# Patient Record
Sex: FEMALE | Race: BLACK OR AFRICAN AMERICAN | NOT HISPANIC OR LATINO | Employment: OTHER | URBAN - METROPOLITAN AREA
[De-identification: names, ages, dates, MRNs, and addresses within clinical notes are randomized per-mention and may not be internally consistent; named-entity substitution may affect disease eponyms.]

---

## 2018-09-27 ENCOUNTER — HOSPITAL ENCOUNTER (EMERGENCY)
Facility: OTHER | Age: 78
Discharge: HOME | End: 2018-09-27
Attending: EMERGENCY MEDICINE
Payer: MEDICARE

## 2018-09-27 VITALS
HEIGHT: 66 IN | SYSTOLIC BLOOD PRESSURE: 146 MMHG | OXYGEN SATURATION: 96 % | TEMPERATURE: 99 F | DIASTOLIC BLOOD PRESSURE: 70 MMHG | BODY MASS INDEX: 35.36 KG/M2 | RESPIRATION RATE: 20 BRPM | WEIGHT: 220 LBS | HEART RATE: 76 BPM

## 2018-09-27 DIAGNOSIS — M17.12 OSTEOARTHRITIS OF LEFT KNEE, UNSPECIFIED OSTEOARTHRITIS TYPE: ICD-10-CM

## 2018-09-27 DIAGNOSIS — M79.662 PAIN AND SWELLING OF LEFT LOWER LEG: ICD-10-CM

## 2018-09-27 DIAGNOSIS — M79.89 PAIN AND SWELLING OF LEFT LOWER LEG: ICD-10-CM

## 2018-09-27 DIAGNOSIS — M71.22 BAKER CYST, LEFT: ICD-10-CM
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
## 2018-09-27 LAB
ALBUMIN SERPL BCP-MCNC: 3.2 G/DL
ALP SERPL-CCNC: 142 U/L
ALT SERPL W/O P-5'-P-CCNC: 11 U/L
ANION GAP SERPL CALC-SCNC: 11 MMOL/L
AST SERPL-CCNC: 20 U/L
BASOPHILS # BLD AUTO: 0.02 K/UL
BASOPHILS NFR BLD: 0.4 %
BILIRUB SERPL-MCNC: 0.4 MG/DL
BUN SERPL-MCNC: 13 MG/DL
CALCIUM SERPL-MCNC: 9.3 MG/DL
CHLORIDE SERPL-SCNC: 91 MMOL/L
CO2 SERPL-SCNC: 39 MMOL/L
CREAT SERPL-MCNC: 4.4 MG/DL
DIFFERENTIAL METHOD: ABNORMAL
EOSINOPHIL # BLD AUTO: 0.2 K/UL
EOSINOPHIL NFR BLD: 3 %
ERYTHROCYTE [DISTWIDTH] IN BLOOD BY AUTOMATED COUNT: 16.3 %
EST. GFR  (AFRICAN AMERICAN): 10 ML/MIN/1.73 M^2
EST. GFR  (NON AFRICAN AMERICAN): 9 ML/MIN/1.73 M^2
GLUCOSE SERPL-MCNC: 220 MG/DL
HCT VFR BLD AUTO: 31.2 %
HGB BLD-MCNC: 9.4 G/DL
LYMPHOCYTES # BLD AUTO: 1.4 K/UL
LYMPHOCYTES NFR BLD: 27.5 %
MCH RBC QN AUTO: 27.2 PG
MCHC RBC AUTO-ENTMCNC: 30.1 G/DL
MCV RBC AUTO: 90 FL
MONOCYTES # BLD AUTO: 0.6 K/UL
MONOCYTES NFR BLD: 11.1 %
NEUTROPHILS # BLD AUTO: 2.9 K/UL
NEUTROPHILS NFR BLD: 57.8 %
PLATELET # BLD AUTO: 157 K/UL
PMV BLD AUTO: 11.8 FL
POTASSIUM SERPL-SCNC: 3.5 MMOL/L
PROT SERPL-MCNC: 8.5 G/DL
RBC # BLD AUTO: 3.46 M/UL
SODIUM SERPL-SCNC: 141 MMOL/L
WBC # BLD AUTO: 4.94 K/UL

## 2018-09-27 PROCEDURE — 25000003 PHARM REV CODE 250: Performed by: PHYSICIAN ASSISTANT

## 2018-09-27 PROCEDURE — 80053 COMPREHEN METABOLIC PANEL: CPT

## 2018-09-27 PROCEDURE — 85025 COMPLETE CBC W/AUTO DIFF WBC: CPT

## 2018-09-27 PROCEDURE — 99284 EMERGENCY DEPT VISIT MOD MDM: CPT | Mod: 25

## 2018-09-27 RX ORDER — TRAMADOL HYDROCHLORIDE 50 MG/1
50 TABLET ORAL EVERY 8 HOURS PRN
Qty: 10 TABLET | Refills: 0 | Status: SHIPPED | OUTPATIENT
Start: 2018-09-27 | End: 2018-10-10 | Stop reason: SDUPTHER

## 2018-09-27 RX ORDER — ACETAMINOPHEN 500 MG
1000 TABLET ORAL
Status: COMPLETED | OUTPATIENT
Start: 2018-09-27 | End: 2018-09-27

## 2018-09-27 RX ORDER — OXYCODONE HYDROCHLORIDE 5 MG/1
5 TABLET ORAL
Status: COMPLETED | OUTPATIENT
Start: 2018-09-27 | End: 2018-09-27

## 2018-09-27 RX ADMIN — ACETAMINOPHEN 1000 MG: 500 TABLET, FILM COATED ORAL at 04:09

## 2018-09-27 RX ADMIN — OXYCODONE HYDROCHLORIDE 5 MG: 5 TABLET ORAL at 05:09

## 2018-09-27 NOTE — ED NOTES
Pt presents with c/o rule out DVT. Pt c/o pain and swelling to her LLE x4 days. Pt does not appear to be in acute distress. RR are even and unlabored. SKin is warm and dry.

## 2018-09-27 NOTE — ED PROVIDER NOTES
Encounter Date: 9/27/2018       History     Chief Complaint   Patient presents with    Leg Pain     left leg pain and swelling, sent by PCP for DVT rule out     Patient is a 78-year-old female with arthritis, diabetes, hypertension, and end-stage renal disease on dialysis who presents to the ED with leg pain and swelling.  Patient states she had a fall 1 week ago while doing laundry.  She states she fell onto her left leg.  She reports no pain immediately after this.  Patient reports onset of left knee and left calf pain and swelling 3 days ago.  Patient states she told her primary care provider about this told her to come to the ED to rule out a blood clot.  Patient states she received her full dialysis today.  She denies chest pain or shortness of breath.  She denies history of blood clots.  She is currently not on any blood thinners.          Review of patient's allergies indicates:   Allergen Reactions    Pcn [penicillins]      Past Medical History:   Diagnosis Date    Arthritis     Diabetes mellitus     Hemodialysis patient     Hypertension     Renal disorder      History reviewed. No pertinent surgical history.  No family history on file.  Social History     Tobacco Use    Smoking status: Never Smoker   Substance Use Topics    Alcohol use: No    Drug use: No     Review of Systems   Constitutional: Negative for chills and fever.   HENT: Negative for congestion and sore throat.    Eyes: Negative for pain.   Respiratory: Negative for shortness of breath.    Cardiovascular: Negative for chest pain.   Gastrointestinal: Negative for abdominal pain, diarrhea, nausea and vomiting.   Genitourinary: Negative for dysuria.   Musculoskeletal:        Left lower leg pain and swelling   Skin: Negative for rash.   Neurological: Negative for headaches.       Physical Exam     Initial Vitals [09/27/18 1521]   BP Pulse Resp Temp SpO2   (!) 177/75 77 20 98.4 °F (36.9 °C) 97 %      MAP       --         Physical  Exam    Constitutional: Vital signs are normal. She is cooperative. No distress.   HENT:   Head: Normocephalic and atraumatic.   Eyes: EOM are normal. Pupils are equal, round, and reactive to light.   Neck: Normal range of motion. Neck supple.   Cardiovascular: Normal rate, regular rhythm and intact distal pulses.   Pulmonary/Chest: Breath sounds normal. She has no wheezes. She has no rhonchi. She has no rales.   Abdominal: Soft. Bowel sounds are normal. There is no tenderness.   Musculoskeletal:   Bilateral lower extremities exhibit 2+ pitting, pretibial edema.   LLE:  No knee laxity or effusion.  Tenderness to the deep venous system.  Left calf does not appear to be larger than the right.  Positive Homans sign. No obvious bony deformity.   Neurological: She is alert and oriented to person, place, and time. She has normal strength. GCS eye subscore is 4. GCS verbal subscore is 5. GCS motor subscore is 6.   Skin: Skin is warm and dry. Capillary refill takes less than 2 seconds. No rash noted.   Left lower extremity does not exhibit erythema or warmth.   Psychiatric: She has a normal mood and affect. Her behavior is normal.         ED Course   Procedures  Labs Reviewed   CBC W/ AUTO DIFFERENTIAL   COMPREHENSIVE METABOLIC PANEL          Imaging Results          X-Ray Tibia Fibula 2 View Left (In process)                X-Ray Knee 3 View Left (In process)                  Medical Decision Making:   Initial Assessment:   Urgent evaluation of a 78 y.o. Female presenting to the emergency department complaining of left lower leg pain. Patient is afebrile, nontoxic appearing and hemodynamically stable.  Patient had mechanical fall 1 week ago.  Pain to left lower extremity did not start until 3 days later.  Patient reports left lower extremity is more swollen.  Both legs appear to be equally edematous.  No obvious bony deformities on exam.  Normal strength.  Normal pedal pulses. Will obtain x-rays and ultrasound to rule out  fracture DVT.  ED Management:  Knee and tib-fib x-ray reveals no acute fracture or dislocation.  There is a small knee effusion noted there is severe narrowing of medial tibial femoral cartilage space with subchondral cyst sclerosis and tricompartmental osteophyte production. Left lower extremity reveals no evidence of DVT.  There is a Baker cyst present.  Patient has degenerative changes in the Baker cyst may be causing her pain. Ace wrap was applied to left knee.  Patient states she has a walker at home.  Baseline labs were obtained in case patient needed anticoagulant.  Lab work reveals anemia that is at patient's baseline.  CMP revealed hyperglycemia, glucose is 220.  Bicarb is 39. Creatinine is 4.4 with GFR of 10 that is at patient's baseline.  No anion gap.  Patient reports relief after receiving Tylenol and oxycodone here in the ED.  Will send her home with a short course of tramadol for her pain she is strongly encouraged to follow up with her primary care provider.  Patient has no other complaints this time is stable for discharge.  Other:   I have discussed this case with another health care provider.  This note was created using MModal Dictation. There may be typographical errors secondary to dictation.                       Clinical Impression:     1. Fall, initial encounter    2. Pain and swelling of left lower leg    3. Osteoarthritis of left knee, unspecified osteoarthritis type    4. Baker cyst, left                               Evelio Finnegan PA-C  09/27/18 6221

## 2018-10-10 ENCOUNTER — HOSPITAL ENCOUNTER (EMERGENCY)
Facility: OTHER | Age: 78
Discharge: HOME | End: 2018-10-10
Attending: EMERGENCY MEDICINE
Payer: MEDICARE

## 2018-10-10 VITALS
SYSTOLIC BLOOD PRESSURE: 180 MMHG | WEIGHT: 230 LBS | HEIGHT: 67 IN | DIASTOLIC BLOOD PRESSURE: 86 MMHG | RESPIRATION RATE: 18 BRPM | BODY MASS INDEX: 36.1 KG/M2 | OXYGEN SATURATION: 98 % | HEART RATE: 75 BPM | TEMPERATURE: 99 F

## 2018-10-10 DIAGNOSIS — M71.22 BAKER'S CYST OF KNEE, LEFT: Primary | ICD-10-CM

## 2018-10-10 DIAGNOSIS — M25.462 PAIN AND SWELLING OF KNEE, LEFT: ICD-10-CM

## 2018-10-10 DIAGNOSIS — M25.562 PAIN AND SWELLING OF KNEE, LEFT: ICD-10-CM

## 2018-10-10 DIAGNOSIS — M79.89 LEFT LEG SWELLING: ICD-10-CM

## 2018-10-10 PROCEDURE — 25000003 PHARM REV CODE 250: Performed by: EMERGENCY MEDICINE

## 2018-10-10 PROCEDURE — 99284 EMERGENCY DEPT VISIT MOD MDM: CPT | Mod: 25

## 2018-10-10 RX ORDER — TRAMADOL HYDROCHLORIDE 50 MG/1
50 TABLET ORAL EVERY 12 HOURS PRN
Qty: 8 TABLET | Refills: 0 | Status: SHIPPED | OUTPATIENT
Start: 2018-10-10

## 2018-10-10 RX ORDER — HYDROCODONE BITARTRATE AND ACETAMINOPHEN 5; 325 MG/1; MG/1
1 TABLET ORAL
Status: COMPLETED | OUTPATIENT
Start: 2018-10-10 | End: 2018-10-10

## 2018-10-10 RX ADMIN — HYDROCODONE BITARTRATE AND ACETAMINOPHEN 1 TABLET: 5; 325 TABLET ORAL at 09:10

## 2018-10-11 NOTE — ED PROVIDER NOTES
Encounter Date: 10/10/2018    SCRIBE #1 NOTE: IIngrid, am scribing for, and in the presence of, Dr. Rutherford.       History     Chief Complaint   Patient presents with    Leg Pain     hurtig X 2 weeks swelling since Sunday     Time seen by provider: 9:08 PM    This is a 78 y.o female, with history of diabetes, HTN, and end-stage renal disease on dialysis who presents with complaint of left leg pain and swelling. The patient states pain began two weeks ago secondary to a fall. She states she was doing laundry and twisted her left leg.  The pain is located to the lateral side of left leg and from the knee down. Pain is worse with walking and extension of left leg. She notes leg swelling began four days ago. The swelling is located from the knee down to the feet. Patient was seen here on 9/27/2018 for the same onset. Per patient, at that time she did not have any leg swelling. At last visit, she had an ultrasound and x-rays done, which were normal. She also reports she was seen at [facility] where she had a CT done with normal results. Patient has an upcoming appointment with an orthopedic at [facility]. She denies history of blood clots. Her last dialysis session was yesterday, in which she received her full dialysis. She is due for another session tomorrow. Patient denies chest pain. SOB, fever, chills, nausea, vomiting, numbness or weakness.      The history is provided by the patient.     Review of patient's allergies indicates:   Allergen Reactions    Pcn [penicillins]      Past Medical History:   Diagnosis Date    Arthritis     Diabetes mellitus     Hemodialysis patient     Hypertension     Renal disorder      Past Surgical History:   Procedure Laterality Date    hemodyalisys cath placement      HYSTERECTOMY      KNEE SURGERY       History reviewed. No pertinent family history.  Social History     Tobacco Use    Smoking status: Never Smoker   Substance Use Topics    Alcohol use: No     Drug use: No     Review of Systems   Constitutional: Negative for chills and fever.   HENT: Negative for rhinorrhea, sore throat and trouble swallowing.    Eyes: Negative for visual disturbance.   Respiratory: Negative for shortness of breath.    Cardiovascular: Positive for leg swelling. Negative for chest pain.   Gastrointestinal: Negative for abdominal pain, nausea and vomiting.   Genitourinary: Negative for dysuria.   Musculoskeletal: Negative for back pain and neck pain.        Positive for leg pain.   Skin: Negative for wound.   Neurological: Negative for weakness, numbness and headaches.   Psychiatric/Behavioral: Negative for confusion.       Physical Exam     Initial Vitals [10/10/18 2047]   BP Pulse Resp Temp SpO2   130/78 82 18 98.7 °F (37.1 °C) 96 %      MAP       --         Physical Exam    Nursing note and vitals reviewed.  Constitutional: She appears well-developed and well-nourished. No distress.   HENT:   Head: Normocephalic and atraumatic.   Right Ear: External ear normal.   Left Ear: External ear normal.   Eyes: Pupils are equal, round, and reactive to light.   Neck: Normal range of motion.   Cardiovascular: Normal rate, regular rhythm and normal heart sounds. Exam reveals no friction rub.    No murmur heard.  Pulses:       Dorsalis pedis pulses are 2+ on the right side, and 2+ on the left side.   Pulmonary/Chest: Breath sounds normal. No respiratory distress. She has no wheezes.   Dialysis catheter to left chest wall.   Abdominal: Soft. There is no tenderness. There is no rebound and no guarding.   Musculoskeletal:   1+ pitting edema below knee of left leg.   Trace pitting edema of right leg.   Extension of knee is limited secondary to pain.   Tenderness along lateral aspect of left leg.   No ecchymosis.   No obvious deformities.   Compartments are soft.  No areas of erythema or warmth.   Neurological: She is alert and oriented to person, place, and time. She has normal strength.   Skin: Skin is  dry. No erythema.   Psychiatric: She has a normal mood and affect. Her behavior is normal. Judgment and thought content normal.         ED Course   Procedures  Labs Reviewed - No data to display       Imaging Results          US Lower Extremity Veins Left (Final result)  Result time 10/10/18 22:40:13    Final result by Radha Poon MD (10/10/18 22:40:13)                 Impression:      No evidence of left lower extremity deep venous thrombosis.    Complex left Baker cyst.      Electronically signed by: Radha Poon MD  Date:    10/10/2018  Time:    22:40             Narrative:    EXAMINATION:  US LOWER EXTREMITY VEINS LEFT    CLINICAL HISTORY:  Other specified soft tissue disorders    TECHNIQUE:  Duplex and color flow Doppler evaluation of the left lower extremity veins was performed.    COMPARISON:  09/27/2018.    FINDINGS:  No evidence of clot involving the bilateral common femoral veins or left greater saphenous, femoral, popliteal, peroneal, anterior and posterior tibial veins.  All venous structures demonstrate normal respiratory phasicity and augment adequately.  Complex left Baker's cyst is visualized measuring 3.7 x 3.1 x 2.1 cm, stable.  Prominent subcutaneous soft tissue edema is seen involving the left lower leg and ankle.                                 Medical Decision Making:   Clinical Tests:   Radiological Study: Ordered and Reviewed    Additional MDM:   Comments: && y/o female with persistent LLE pain x 2 weeks now with edema from the knee down x 4 days.  Pt N/V intact with pitting and decreased ROM 2/2 pain primarily along the lateral aspect of the knee.  I have reviewed the radiology studies from her recent visit and they were + for tricompartmental DJD and a baker's cyst with no Fx/dislocation or DVT noted.  Given persistence of pain now associated with edema, we will repeat the US although my suspicion is for a ligamentous injury.  I have discussed this with the patient and her .   If the US is neg for a DVT she will be d/c'ed home with a knee immobilizer and crutches with plan for her to keep her appointment with ortho at [facility] for further evaluation.      11:04 PM  Ultrasound shows no new findings.  Results were discussed with the patient and the plan was reiterated.  She verbalized understanding and agreement.  She will be discharged at this time with a walker and knee immobilizer.          Scribe Attestation:   Scribe #1: I performed the above scribed service and the documentation accurately describes the services I performed. I attest to the accuracy of the note.    Attending Attestation:           Physician Attestation for Scribe:  Physician Attestation Statement for Scribe #1: I, Dr. Rutherford, reviewed documentation, as scribed by Ingrid Maldonado in my presence, and it is both accurate and complete.                    Clinical Impression:     1. Baker's cyst of knee, left    2. Left leg swelling    3. Pain and swelling of knee, left                                   Nova Rutherford MD  10/10/18 [id number]

## 2018-10-11 NOTE — ED TRIAGE NOTES
"Pt arrived to ED with c/o left knee pain that radiates down the leg x2 weeks and left leg swelling since Sunday. Pt goes to dialysis on T/Thurs/Sat and states " I thought the swelling was going to go down after dialysis but it hasn't and my leg is constantly hurting" . Pt denies sob, fever, chills, n/v, cp.  Moderate swelling noted to bilateral legs. +2 bilateral pedial pulses noted   "

## 2018-10-11 NOTE — DISCHARGE INSTRUCTIONS
Take Tylenol as needed for milder pain. Take tramadol as prescribed only for severe pain. Knee immobilizer on at all times except for showering/bathing.  Follow up with Orthopedics at [facility] as already scheduled.

## 2019-04-17 ENCOUNTER — HOSPITAL ENCOUNTER (EMERGENCY)
Facility: OTHER | Age: 79
Discharge: HOME | End: 2019-04-17
Attending: EMERGENCY MEDICINE
Payer: MEDICARE

## 2019-04-17 VITALS
TEMPERATURE: 98 F | DIASTOLIC BLOOD PRESSURE: 114 MMHG | BODY MASS INDEX: 36.96 KG/M2 | SYSTOLIC BLOOD PRESSURE: 192 MMHG | HEIGHT: 66 IN | HEART RATE: 74 BPM | RESPIRATION RATE: 18 BRPM | WEIGHT: 230 LBS | OXYGEN SATURATION: 98 %

## 2019-04-17 DIAGNOSIS — K80.20 CALCULUS OF GALLBLADDER WITHOUT CHOLECYSTITIS WITHOUT OBSTRUCTION: ICD-10-CM

## 2019-04-17 DIAGNOSIS — Z99.2 ESRD ON DIALYSIS: ICD-10-CM

## 2019-04-17 DIAGNOSIS — R31.9 HEMATURIA, UNSPECIFIED TYPE: ICD-10-CM

## 2019-04-17 DIAGNOSIS — K57.90 DIVERTICULOSIS OF INTESTINE WITHOUT BLEEDING, UNSPECIFIED INTESTINAL TRACT LOCATION: ICD-10-CM

## 2019-04-17 DIAGNOSIS — R10.9 ABDOMINAL PAIN: Primary | ICD-10-CM

## 2019-04-17 DIAGNOSIS — K44.9 HIATAL HERNIA: ICD-10-CM

## 2019-04-17 DIAGNOSIS — N18.6 ESRD ON DIALYSIS: ICD-10-CM

## 2019-04-17 DIAGNOSIS — R80.9 PROTEINURIA, UNSPECIFIED TYPE: ICD-10-CM

## 2019-04-17 DIAGNOSIS — R73.9 HYPERGLYCEMIA: ICD-10-CM

## 2019-04-17 DIAGNOSIS — D64.9 ANEMIA, UNSPECIFIED TYPE: ICD-10-CM

## 2019-04-17 LAB
ALBUMIN SERPL BCP-MCNC: 3.2 G/DL (ref 3.5–5.2)
ALP SERPL-CCNC: 126 U/L (ref 55–135)
ALT SERPL W/O P-5'-P-CCNC: 11 U/L (ref 10–44)
ANION GAP SERPL CALC-SCNC: 14 MMOL/L (ref 8–16)
AST SERPL-CCNC: 11 U/L (ref 10–40)
B-OH-BUTYR BLD STRIP-SCNC: 0.1 MMOL/L (ref 0–0.5)
BACTERIA #/AREA URNS HPF: ABNORMAL /HPF
BASOPHILS # BLD AUTO: 0.02 K/UL (ref 0–0.2)
BASOPHILS NFR BLD: 0.4 % (ref 0–1.9)
BILIRUB SERPL-MCNC: 0.8 MG/DL (ref 0.1–1)
BILIRUB UR QL STRIP: NEGATIVE
BUN SERPL-MCNC: 35 MG/DL (ref 8–23)
CALCIUM SERPL-MCNC: 9.4 MG/DL (ref 8.7–10.5)
CHLORIDE SERPL-SCNC: 89 MMOL/L (ref 95–110)
CLARITY UR: ABNORMAL
CO2 SERPL-SCNC: 34 MMOL/L (ref 23–29)
COLOR UR: YELLOW
CREAT SERPL-MCNC: 8.5 MG/DL (ref 0.5–1.4)
DIFFERENTIAL METHOD: ABNORMAL
EOSINOPHIL # BLD AUTO: 0.2 K/UL (ref 0–0.5)
EOSINOPHIL NFR BLD: 3.9 % (ref 0–8)
ERYTHROCYTE [DISTWIDTH] IN BLOOD BY AUTOMATED COUNT: 18.3 % (ref 11.5–14.5)
EST. GFR  (AFRICAN AMERICAN): 5 ML/MIN/1.73 M^2
EST. GFR  (NON AFRICAN AMERICAN): 4 ML/MIN/1.73 M^2
GLUCOSE SERPL-MCNC: 353 MG/DL (ref 70–110)
GLUCOSE UR QL STRIP: ABNORMAL
HCT VFR BLD AUTO: 38 % (ref 37–48.5)
HGB BLD-MCNC: 11.9 G/DL (ref 12–16)
HGB UR QL STRIP: ABNORMAL
HYALINE CASTS #/AREA URNS LPF: 0 /LPF
KETONES UR QL STRIP: NEGATIVE
LEUKOCYTE ESTERASE UR QL STRIP: ABNORMAL
LIPASE SERPL-CCNC: 19 U/L (ref 4–60)
LYMPHOCYTES # BLD AUTO: 1.5 K/UL (ref 1–4.8)
LYMPHOCYTES NFR BLD: 27.4 % (ref 18–48)
MCH RBC QN AUTO: 25.5 PG (ref 27–31)
MCHC RBC AUTO-ENTMCNC: 31.3 G/DL (ref 32–36)
MCV RBC AUTO: 81 FL (ref 82–98)
MICROSCOPIC COMMENT: ABNORMAL
MONOCYTES # BLD AUTO: 0.4 K/UL (ref 0.3–1)
MONOCYTES NFR BLD: 7.4 % (ref 4–15)
NEUTROPHILS # BLD AUTO: 3.3 K/UL (ref 1.8–7.7)
NEUTROPHILS NFR BLD: 60.9 % (ref 38–73)
NITRITE UR QL STRIP: NEGATIVE
PH UR STRIP: 8 [PH] (ref 5–8)
PLATELET # BLD AUTO: 136 K/UL (ref 150–350)
PLATELET BLD QL SMEAR: ABNORMAL
PMV BLD AUTO: 11.8 FL (ref 9.2–12.9)
POCT GLUCOSE: 277 MG/DL (ref 70–110)
POIKILOCYTOSIS BLD QL SMEAR: SLIGHT
POTASSIUM SERPL-SCNC: 4.4 MMOL/L (ref 3.5–5.1)
PROT SERPL-MCNC: 8.1 G/DL (ref 6–8.4)
PROT UR QL STRIP: ABNORMAL
RBC # BLD AUTO: 4.67 M/UL (ref 4–5.4)
RBC #/AREA URNS HPF: 3 /HPF (ref 0–4)
SODIUM SERPL-SCNC: 137 MMOL/L (ref 136–145)
SP GR UR STRIP: 1.01 (ref 1–1.03)
TARGETS BLD QL SMEAR: ABNORMAL
TROPONIN I SERPL DL<=0.01 NG/ML-MCNC: 0.01 NG/ML (ref 0–0.03)
URN SPEC COLLECT METH UR: ABNORMAL
UROBILINOGEN UR STRIP-ACNC: NEGATIVE EU/DL
WBC # BLD AUTO: 5.4 K/UL (ref 3.9–12.7)
WBC #/AREA URNS HPF: 25 /HPF (ref 0–5)
WBC CLUMPS URNS QL MICRO: ABNORMAL

## 2019-04-17 PROCEDURE — 82010 KETONE BODYS QUAN: CPT

## 2019-04-17 PROCEDURE — 93010 EKG 12-LEAD: ICD-10-PCS | Mod: ,,, | Performed by: INTERNAL MEDICINE

## 2019-04-17 PROCEDURE — 81000 URINALYSIS NONAUTO W/SCOPE: CPT

## 2019-04-17 PROCEDURE — 93010 ELECTROCARDIOGRAM REPORT: CPT | Mod: ,,, | Performed by: INTERNAL MEDICINE

## 2019-04-17 PROCEDURE — 80053 COMPREHEN METABOLIC PANEL: CPT

## 2019-04-17 PROCEDURE — 83690 ASSAY OF LIPASE: CPT

## 2019-04-17 PROCEDURE — 25000003 PHARM REV CODE 250: Performed by: EMERGENCY MEDICINE

## 2019-04-17 PROCEDURE — 99284 EMERGENCY DEPT VISIT MOD MDM: CPT | Mod: 25

## 2019-04-17 PROCEDURE — 96360 HYDRATION IV INFUSION INIT: CPT

## 2019-04-17 PROCEDURE — 82962 GLUCOSE BLOOD TEST: CPT

## 2019-04-17 PROCEDURE — 84484 ASSAY OF TROPONIN QUANT: CPT

## 2019-04-17 PROCEDURE — 25500020 PHARM REV CODE 255: Performed by: EMERGENCY MEDICINE

## 2019-04-17 PROCEDURE — 87088 URINE BACTERIA CULTURE: CPT

## 2019-04-17 PROCEDURE — 87077 CULTURE AEROBIC IDENTIFY: CPT

## 2019-04-17 PROCEDURE — 87186 SC STD MICRODIL/AGAR DIL: CPT

## 2019-04-17 PROCEDURE — 85025 COMPLETE CBC W/AUTO DIFF WBC: CPT

## 2019-04-17 PROCEDURE — 93005 ELECTROCARDIOGRAM TRACING: CPT

## 2019-04-17 PROCEDURE — 87086 URINE CULTURE/COLONY COUNT: CPT

## 2019-04-17 RX ORDER — CIPROFLOXACIN 500 MG/1
500 TABLET ORAL 2 TIMES DAILY
Qty: 20 TABLET | Refills: 0 | Status: SHIPPED | OUTPATIENT
Start: 2019-04-17 | End: 2019-04-17 | Stop reason: SDUPTHER

## 2019-04-17 RX ORDER — CIPROFLOXACIN 500 MG/1
500 TABLET ORAL 2 TIMES DAILY
Qty: 14 TABLET | Refills: 0 | Status: SHIPPED | OUTPATIENT
Start: 2019-04-17 | End: 2019-04-24

## 2019-04-17 RX ADMIN — IOHEXOL 30 ML: 350 INJECTION, SOLUTION INTRAVENOUS at 05:04

## 2019-04-17 RX ADMIN — SODIUM CHLORIDE 500 ML: 0.9 INJECTION, SOLUTION INTRAVENOUS at 07:04

## 2019-04-17 NOTE — ED TRIAGE NOTES
Pt c/o abd discomfort and gas that has been ongoing since Saturday. Pt states symptoms worsened today. Pain from LLQ radiates across to lower back. Pt is a dialysis pt. Pt received dialysis yesterday with no complications. Pt reports still making small amounts of urine. Pt denies any urinary frequency, burning or blood in urine. Pt reports having normal bowel movement. Pt denies cp, fever, chills, N/V/D, dizziness, or weakness. NAD noted. Resp even and unlabored.

## 2019-04-17 NOTE — ED NOTES
Pt unable to urinate into bed pan. In and out catheter done with sterile technique to collect urine. Pt tolerated well.

## 2019-04-17 NOTE — ED PROVIDER NOTES
Encounter Date: 4/17/2019    SCRIBE #1 NOTE: IBrenda, rose scribing for, and in the presence of, Dr. Burgess.       History     Chief Complaint   Patient presents with    Flank Pain     L flank pain since Saturday. reports cough x 2 weeks and pain is worse with coughing. denies any injury or change in urination     Time seen by provider: 5:03 PM    This is a 78 y.o. female with hx of HTN, DM, and ESRD on dialysis TTS who presents with complaint of L sided abdominal pain, radiating around into the L side of back for four days. Pt states that the pain is under her ribs. She states that it is intermittent, having onset with movement. Pt states that she was watching TV at onset. She had dialysis yesterday, with no complications or complaints of pain during. She also c/o belching and passing gas more than baseline. Pt has no hx of nephrolithiasis, diverticulitis, or diverticulosis. She has PSHx of hysterectomy, with no other abdominal surgeries. Pt does not smoke or drink. She is allergic to penicillins. Pt has been drinking and eating normally. She denies any fever, chills, N/V/D, constipation, chest pain, SOB, urinary sx, neck pain, lightheadedness, and weakness. Her dialysis is followed by Dr. Shin. She has been on dialysis for three years.    The history is provided by the patient.     Review of patient's allergies indicates:   Allergen Reactions    Pcn [penicillins]      Past Medical History:   Diagnosis Date    Arthritis     Diabetes mellitus     Hemodialysis patient     Hypertension     Renal disorder      Past Surgical History:   Procedure Laterality Date    hemodyalisys cath placement      HYSTERECTOMY      KNEE SURGERY       History reviewed. No pertinent family history.  Social History     Tobacco Use    Smoking status: Never Smoker   Substance Use Topics    Alcohol use: No    Drug use: No     Review of Systems   Constitutional: Negative for chills and fever.   HENT: Negative for  congestion and sore throat.    Eyes: Negative for photophobia and redness.   Respiratory: Negative for cough and shortness of breath.    Cardiovascular: Negative for chest pain.   Gastrointestinal: Positive for abdominal pain (L sided). Negative for constipation, diarrhea, nausea and vomiting.        Positive for passing gas and belching beyond baseline.   Genitourinary: Negative for difficulty urinating, dysuria, frequency, hematuria and urgency.   Musculoskeletal: Positive for back pain (L sided). Negative for neck pain.   Skin: Negative for rash.   Neurological: Negative for weakness, light-headedness and headaches.   Psychiatric/Behavioral: Negative for confusion.       Physical Exam     Initial Vitals [04/17/19 1633]   BP Pulse Resp Temp SpO2   133/66 74 18 98.4 °F (36.9 °C) (!) 93 %      MAP       --         Physical Exam    Nursing note and vitals reviewed.  Constitutional: She appears well-developed and well-nourished. She is not diaphoretic. No distress.   HENT:   Head: Normocephalic and atraumatic.   Right Ear: External ear normal.   Left Ear: External ear normal.   Eyes: Conjunctivae and EOM are normal. Pupils are equal, round, and reactive to light. Right eye exhibits no discharge. Left eye exhibits no discharge.   Neck: Normal range of motion. Neck supple.   Cardiovascular: Normal rate, regular rhythm and normal heart sounds.   Normal S1, S2. No murmurs, no rubs, no gallops.    Pulmonary/Chest: Breath sounds normal. No respiratory distress. She has no wheezes. She has no rhonchi. She has no rales.   Abdominal: Soft. Bowel sounds are normal. She exhibits no distension. There is tenderness (L, mid quadrant). There is no rebound and no guarding.   Musculoskeletal: Normal range of motion. She exhibits no edema or tenderness.   Lymphadenopathy:     She has no cervical adenopathy.   Neurological: She is alert and oriented to person, place, and time. She has normal strength. No sensory deficit.   Skin: Skin is  warm and dry. No rash noted. No erythema.   No rash or vesicular lesions.   Psychiatric: She has a normal mood and affect. Her behavior is normal.         ED Course   Procedures  Labs Reviewed   CBC W/ AUTO DIFFERENTIAL - Abnormal; Notable for the following components:       Result Value    Hemoglobin 11.9 (*)     MCV 81 (*)     MCH 25.5 (*)     MCHC 31.3 (*)     RDW 18.3 (*)     Platelets 136 (*)     Platelet Estimate Decreased (*)     All other components within normal limits   COMPREHENSIVE METABOLIC PANEL - Abnormal; Notable for the following components:    Chloride 89 (*)     CO2 34 (*)     Glucose 353 (*)     BUN, Bld 35 (*)     Creatinine 8.5 (*)     Albumin 3.2 (*)     eGFR if  5 (*)     eGFR if non  4 (*)     All other components within normal limits   URINALYSIS, REFLEX TO URINE CULTURE - Abnormal; Notable for the following components:    Appearance, UA Hazy (*)     Protein, UA 2+ (*)     Glucose, UA 1+ (*)     Occult Blood UA 2+ (*)     Leukocytes, UA 3+ (*)     All other components within normal limits    Narrative:     Preferred Collection Type->Urine, Clean Catch   URINALYSIS MICROSCOPIC - Abnormal; Notable for the following components:    WBC, UA 25 (*)     WBC Clumps, UA Occasional (*)     Bacteria, UA Few (*)     All other components within normal limits    Narrative:     Preferred Collection Type->Urine, Clean Catch   POCT GLUCOSE - Abnormal; Notable for the following components:    POCT Glucose 277 (*)     All other components within normal limits   CULTURE, URINE   LIPASE   TROPONIN I   BETA - HYDROXYBUTYRATE, SERUM     EKG Readings: (Independently Interpreted)   NSR at a rate of 73. LBBB, new when compared when compared to EKG from October 22, 2016.       Imaging Results          CT Abdomen Pelvis  Without Contrast (Final result)  Result time 04/17/19 19:19:39    Final result by Donnie Jules MD (04/17/19 19:19:39)                 Impression:      1. Large  amount of stool in the rectum noting rectal wall thickening and indistinctness about the rectal walls.  Findings may reflect developing stercoral proctitis in the setting of constipation and/or impaction.  Clinical correlation is recommended.  2. Multiple scattered colonic diverticula, no convincing inflammation to suggest diverticulitis.  3. Low attenuating lesions within the kidneys, the majority of which are too small for characterization, some of which may reflect cysts.  4. Moderate hiatal hernia.  5. Cholelithiasis.  6. Possible calculus within the proximal left ureter versus adjacent phlebolith.  Please note, there are no secondary signs of obstructive uropathy, this may well lie outside of the ureter.  Comparison with any previous exams would be helpful.  7. Additional findings above      Electronically signed by: Donnie Jules MD  Date:    04/17/2019  Time:    19:19             Narrative:    EXAMINATION:  CT ABDOMEN PELVIS WITHOUT CONTRAST    CLINICAL HISTORY:  LLQ pain, suspect diverticulitis;    TECHNIQUE:  Low dose axial images, sagittal and coronal reformations were obtained from the lung bases to the pubic symphysis.  30 mL of oral Omnipaque 350 was administered.    COMPARISON:  None    FINDINGS:  Images of the lower thorax are remarkable for bilateral dependent atelectasis/scarring.  There is calcification of the aortic arch.  There is calcification in the distribution of the coronary arteries.    The liver, spleen, pancreas and adrenal glands have a grossly unremarkable noncontrast appearance allowing for extensive patient motion.  There is cholelithiasis without significant biliary dilation.  The pancreatic duct is not dilated.  There is a mild to moderate hiatal hernia.  The stomach is partially distended with oral contrast.  No significant abdominal lymphadenopathy.    The kidneys are small bilaterally.  There are low attenuating subcentimeter foci within the kidneys, too small for  characterization although some of which measure attenuation suggesting cysts.  There is no hydronephrosis or nephrolithiasis.  The right ureter is grossly unremarkable.  The left ureter is unable to be accurately followed to the urinary bladder noting there is a focus of calcification along the expected course of the left ureter, ureteral calculus is not excluded noting no secondary findings to suggest obstructive uropathy.  This calcification measures 0.4 cm, and is possibly a chronic finding.  The urinary bladder is decompressed noting there may be residual wall thickening.  The uterus is absent the adnexa is grossly unremarkable.    There is a large amount of stool in the rectum noting perirectal fat stranding, findings may reflect developing stercoral proctitis in the setting of impaction.  There are several scattered colonic diverticula, no definite surrounding inflammation to suggest diverticulitis.  The terminal ileum and appendix are grossly unremarkable.  The small bowel is grossly unremarkable.  There is atherosclerotic calcification of the aorta and its branches.  No focal organized pelvic fluid collection.    There is osteopenia.  Degenerative changes are noted of the spine.  No significant inguinal lymphadenopathy.  There is a small fat containing right inguinal hernia without inflammation.                                 Medical Decision Making:   Independently Interpreted Test(s):   I have ordered and independently interpreted EKG Reading(s) - see prior notes  Clinical Tests:   Lab Tests: Ordered and Reviewed  Radiological Study: Ordered  Medical Tests: Ordered and Reviewed            Scribe Attestation:   Scribe #1: I performed the above scribed service and the documentation accurately describes the services I performed. I attest to the accuracy of the note.    Attending Attestation:           Physician Attestation for Scribe:  Physician Attestation Statement for Scribe #1: I, Dr. Burgess, reviewed  documentation, as scribed by Brenda Tyler in my presence, and it is both accurate and complete.         Attending ED Notes:   Emergent evaluation a 78-year-old female with left-sided abdominal pain.  Patient is afebrile, nontoxic-appearing stable vital signs except for elevation of blood pressure.  Urinary analysis reveals protein, blood and leukocytes consistent with urinary tract infection.  Blood glucose is 353.  No clinical or diagnostic evidence of diabetic ketoacidosis.  Beta hydroxybutyrate is 0.1.  Patient has no elevation of white blood cell count.  H&H 11.9 and 38.  Chloride of 89.  CO2 of 34.  BUN creatinine are 35 and 8.5.  Lipase is 19.  CT scan reveals large amount of stool in the rectum, diverticulosis without evidence of diverticulitis, hiatal hernia, renal cyst, cholelithiasis and possible calculus within the left ureter versus phlebolith.  The patient is extensively counseled on her diagnosis and treatment including all diagnostic, laboratory and physical exam findings.  The patient discharged good condition and directed follow-up with her PCP and urology in the next 24-48 hours.             Clinical Impression:     1. Abdominal pain    2. Anemia, unspecified type    3. ESRD on dialysis    4. Hyperglycemia    5. Proteinuria, unspecified type    6. Hematuria, unspecified type    7. Hiatal hernia    8. Diverticulosis of intestine without bleeding, unspecified intestinal tract location    9. Calculus of gallbladder without cholecystitis without obstruction                                 Jose Sadler MD  04/18/19 1042

## 2019-04-17 NOTE — ED NOTES
Provider informed of pt's CBG. Per provider hold insulin but still give fluids. Insulin not given

## 2019-04-17 NOTE — ED NOTES
Pt requested we try putting her on bed pan before attempting to try in and out catheter. Pt put on bed pan as requested

## 2019-04-19 ENCOUNTER — HOSPITAL ENCOUNTER (EMERGENCY)
Facility: OTHER | Age: 79
Discharge: HOME | End: 2019-04-19
Attending: EMERGENCY MEDICINE
Payer: MEDICARE

## 2019-04-19 VITALS
HEART RATE: 72 BPM | WEIGHT: 230 LBS | HEIGHT: 66 IN | DIASTOLIC BLOOD PRESSURE: 71 MMHG | OXYGEN SATURATION: 95 % | TEMPERATURE: 98 F | RESPIRATION RATE: 16 BRPM | SYSTOLIC BLOOD PRESSURE: 162 MMHG | BODY MASS INDEX: 36.96 KG/M2

## 2019-04-19 DIAGNOSIS — R10.9 ABDOMINAL PAIN: ICD-10-CM

## 2019-04-19 DIAGNOSIS — M54.6 ACUTE LEFT-SIDED THORACIC BACK PAIN: Primary | ICD-10-CM

## 2019-04-19 LAB — POCT GLUCOSE: 300 MG/DL (ref 70–110)

## 2019-04-19 PROCEDURE — 96372 THER/PROPH/DIAG INJ SC/IM: CPT | Mod: 59

## 2019-04-19 PROCEDURE — 99284 EMERGENCY DEPT VISIT MOD MDM: CPT | Mod: 25

## 2019-04-19 PROCEDURE — 25000003 PHARM REV CODE 250: Performed by: EMERGENCY MEDICINE

## 2019-04-19 PROCEDURE — 63600175 PHARM REV CODE 636 W HCPCS: Performed by: EMERGENCY MEDICINE

## 2019-04-19 PROCEDURE — 93010 EKG 12-LEAD: ICD-10-PCS | Mod: ,,, | Performed by: INTERNAL MEDICINE

## 2019-04-19 PROCEDURE — 93005 ELECTROCARDIOGRAM TRACING: CPT

## 2019-04-19 PROCEDURE — 82962 GLUCOSE BLOOD TEST: CPT

## 2019-04-19 PROCEDURE — 93010 ELECTROCARDIOGRAM REPORT: CPT | Mod: ,,, | Performed by: INTERNAL MEDICINE

## 2019-04-19 RX ORDER — ACETAMINOPHEN 500 MG
1000 TABLET ORAL
Status: COMPLETED | OUTPATIENT
Start: 2019-04-19 | End: 2019-04-19

## 2019-04-19 RX ORDER — ORPHENADRINE CITRATE 30 MG/ML
60 INJECTION INTRAMUSCULAR; INTRAVENOUS
Status: COMPLETED | OUTPATIENT
Start: 2019-04-19 | End: 2019-04-19

## 2019-04-19 RX ORDER — ORPHENADRINE CITRATE 100 MG/1
100 TABLET, EXTENDED RELEASE ORAL 2 TIMES DAILY PRN
Qty: 20 TABLET | Refills: 0 | Status: SHIPPED | OUTPATIENT
Start: 2019-04-19 | End: 2019-04-29

## 2019-04-19 RX ADMIN — ORPHENADRINE CITRATE 60 MG: 30 INJECTION INTRAMUSCULAR; INTRAVENOUS at 04:04

## 2019-04-19 RX ADMIN — ACETAMINOPHEN 1000 MG: 500 TABLET ORAL at 04:04

## 2019-04-19 NOTE — ED NOTES
Pt to ED, PMH of CKD with dialysis, reporting LUQ ABD pain x 1 week, without nausea, vomiting, diarrhea. Last dialysis was yesterday. Pt AAOx4 and appropriate at this time. Respirations even and unlabored. No acute distress noted.  Bed is locked and in lowest position with side rails up x2. Call bell within reach and pt oriented to use of call bell. Pt placed on continuous cardiac monitoring, continuous pulse ox, and continuous BP cuff. Will continue to monitor.

## 2019-04-19 NOTE — ED NOTES
"ROUNDING:  Lying on the stretcher with HOB elevated. AAOx4. States pain 0/10 at rest and with movement "I can move and it doesn't hurt." Denies any other discomfort at this time. Skin is warm and dry. Resp:20even and unlabored. Comfort and BR needs addressed. Plan of care updated. NADN. Bed locked in low position, side rails up x2 and call light within reach. Will continue to monitor.  "

## 2019-04-19 NOTE — ED PROVIDER NOTES
"Encounter Date: 4/19/2019    SCRIBE #1 NOTE: I, Taylor Conenr, am scribing for, and in the presence of, Dr. Marie.       History     Chief Complaint   Patient presents with    Abdominal Pain     + left sided abdominal pains radiating to left lower back w/ new onset Saturday. Pt states," I drank some of that stuff for a test the other day and since then my stomach has been all messed up". Denies N/V/D, fever or chills Last dialysis yesterday. No chest pains or SOB     Time seen by provider: 3:59 PM    This is a 78 y.o. female, with a history of DM, HTN, ESRD on dialysis, who presents with complaint of abdominal pain that began six days ago. Patient reports left lower abdominal pain that radiates to the left back. She states pain is constant and worsens with movement. She reports decreased appetite. Patient was seen in this ED on 4/17 for this complaint, and was prescribed Cipro at discharge. She reports taking Cipro with no improvement of symptoms. She denies fever, shortness of breath, chest pain, nausea, vomiting, diarrhea, constipation, dysuria, hematuria, or difficulty urinating. She denies taking any OTC medications for relief.    The history is provided by the patient.     Review of patient's allergies indicates:   Allergen Reactions    Pcn [penicillins]      Past Medical History:   Diagnosis Date    Arthritis     Diabetes mellitus     Hemodialysis patient     Hypertension     Renal disorder      Past Surgical History:   Procedure Laterality Date    hemodyalisys cath placement      HYSTERECTOMY      KNEE SURGERY       No family history on file.  Social History     Tobacco Use    Smoking status: Never Smoker   Substance Use Topics    Alcohol use: No    Drug use: No     Review of Systems   Constitutional: Positive for appetite change (Decreased). Negative for fever.   HENT: Negative for sore throat.    Eyes: Negative for redness.   Respiratory: Negative for shortness of breath.    Cardiovascular: " Negative for chest pain.   Gastrointestinal: Positive for abdominal pain (Left). Negative for constipation, diarrhea, nausea and vomiting.   Genitourinary: Negative for difficulty urinating, dysuria and hematuria.   Musculoskeletal: Positive for back pain (Left).   Skin: Negative for rash.   Neurological: Negative for weakness.   Hematological: Does not bruise/bleed easily.       Physical Exam     Initial Vitals [04/19/19 1529]   BP Pulse Resp Temp SpO2   (!) 157/70 76 18 98.3 °F (36.8 °C) 95 %      MAP       --         Physical Exam    Nursing note and vitals reviewed.  Constitutional: She appears well-developed and well-nourished. No distress.   Well appearing.    HENT:   Head: Normocephalic and atraumatic.   Mouth/Throat: Oropharynx is clear and moist.   Eyes: Conjunctivae and EOM are normal. Pupils are equal, round, and reactive to light.   Neck: Normal range of motion. Neck supple.   Cardiovascular: Normal rate, regular rhythm and normal heart sounds. Exam reveals no gallop and no friction rub.    No murmur heard.  Pulmonary/Chest: Breath sounds normal. No respiratory distress. She has no wheezes. She has no rhonchi. She has no rales.   Abdominal: Soft. Bowel sounds are normal. She exhibits no distension. There is no tenderness. There is no rebound and no guarding.   Musculoskeletal: Normal range of motion. She exhibits no edema or tenderness.   Left lower thoracic pain elicited with movement.    Neurological: She is alert and oriented to person, place, and time.   Skin: Skin is warm and dry. No rash noted.   No rashes or skin changes.    Psychiatric: She has a normal mood and affect. Her behavior is normal. Judgment and thought content normal.         ED Course   Procedures  Labs Reviewed   POCT GLUCOSE - Abnormal; Notable for the following components:       Result Value    POCT Glucose 300 (*)     All other components within normal limits     EKG Readings: (Independently Interpreted)   Normal sinus rhythm at  a rate of 75 bpm Widened QRS due to LBBB. No acute ischemia. No change compared to an EKG from April 17, 2019.        Imaging Results    None          Medical Decision Making:   Independently Interpreted Test(s):   I have ordered and independently interpreted EKG Reading(s) - see prior notes  Clinical Tests:   Lab Tests: Ordered and Reviewed  Medical Tests: Ordered and Reviewed            Scribe Attestation:   Scribe #1: I performed the above scribed service and the documentation accurately describes the services I performed. I attest to the accuracy of the note.    Attending Attestation:           Physician Attestation for Scribe:  Physician Attestation Statement for Scribe #1: I, Dr. Marie, reviewed documentation, as scribed by Taylor Prieto in my presence, and it is both accurate and complete.           Elderly female presents with persistent pain left flank radiating around to the left abdomen.  Been present for several days, nearly a week.  Seen here 2 days ago.  Review of that visit shows extensive workup without acute findings or etiology found has started the ciprofloxacin but is not feeling better.  On my exam the pain that she describes is very associated with movement, not present at rest.  Elicitable with movement of torso.  There is no shingles rash or other skin changes the area her abdominal exam is benign it seems musculoskeletal in nature to me and it is improved after Tylenol, muscle relaxant.  Prescription for similar medication provided I do not think she needs further workup in light of the her recent extensive inappropriate testing encouraged follow-up with primary care.  Return precautions discussed.          ED Course as of Apr 19 2258 Fri Apr 19, 2019   1749 Feels much better after Tylenol and Norflex. Pain resolved.     [LM]      ED Course User Index  [LM] Taylor Prieto     Clinical Impression:     1. Acute left-sided thoracic back pain    2. Abdominal pain                                    Hu Marie II, MD  04/19/19 4813

## 2019-04-20 LAB — BACTERIA UR CULT: NORMAL
